# Patient Record
Sex: FEMALE | Race: BLACK OR AFRICAN AMERICAN | Employment: FULL TIME | URBAN - METROPOLITAN AREA
[De-identification: names, ages, dates, MRNs, and addresses within clinical notes are randomized per-mention and may not be internally consistent; named-entity substitution may affect disease eponyms.]

---

## 2017-02-08 ENCOUNTER — TELEPHONE (OUTPATIENT)
Dept: SURGERY | Age: 67
End: 2017-02-08

## 2017-02-08 RX ORDER — FLUCONAZOLE 150 MG/1
TABLET ORAL
Qty: 2 TAB | Refills: 1 | Status: SHIPPED | OUTPATIENT
Start: 2017-02-08 | End: 2018-01-19

## 2017-02-08 RX ORDER — NYSTATIN 100000 U/G
CREAM TOPICAL 2 TIMES DAILY
Qty: 15 G | Refills: 2 | Status: SHIPPED | OUTPATIENT
Start: 2017-02-08 | End: 2018-01-19

## 2018-01-19 ENCOUNTER — OFFICE VISIT (OUTPATIENT)
Dept: SURGERY | Age: 68
End: 2018-01-19

## 2018-01-19 ENCOUNTER — HOSPITAL ENCOUNTER (OUTPATIENT)
Dept: LAB | Age: 68
Discharge: HOME | End: 2018-01-19
Payer: COMMERCIAL

## 2018-01-19 VITALS
DIASTOLIC BLOOD PRESSURE: 96 MMHG | HEART RATE: 90 BPM | SYSTOLIC BLOOD PRESSURE: 148 MMHG | TEMPERATURE: 97.6 F | HEIGHT: 67 IN | OXYGEN SATURATION: 98 % | WEIGHT: 165.6 LBS | BODY MASS INDEX: 25.99 KG/M2

## 2018-01-19 DIAGNOSIS — R10.31 RLQ ABDOMINAL PAIN: ICD-10-CM

## 2018-01-19 DIAGNOSIS — Z01.419 ENCOUNTER FOR ROUTINE GYNECOLOGICAL EXAMINATION WITH PAPANICOLAOU SMEAR OF CERVIX: Primary | ICD-10-CM

## 2018-01-19 DIAGNOSIS — N95.1 SYMPTOMATIC MENOPAUSAL OR FEMALE CLIMACTERIC STATES: ICD-10-CM

## 2018-01-19 DIAGNOSIS — Z90.710 S/P TAH (TOTAL ABDOMINAL HYSTERECTOMY): ICD-10-CM

## 2018-01-19 PROCEDURE — 88142 CYTOPATH C/V THIN LAYER: CPT | Performed by: OBSTETRICS & GYNECOLOGY

## 2018-01-19 RX ORDER — DULAGLUTIDE 1.5 MG/.5ML
INJECTION, SOLUTION SUBCUTANEOUS
COMMUNITY
Start: 2018-01-10

## 2018-01-19 RX ORDER — HUMAN INSULIN 100 [IU]/ML
INJECTION, SUSPENSION SUBCUTANEOUS
COMMUNITY
Start: 2017-11-13

## 2018-01-19 NOTE — PROGRESS NOTES
SUBJECTIVE: Maurita Shone is a 69 y.o. female who presents with desire for annual well woman exam. Patient's last menstrual period was 1980. Allergies   Allergen Reactions    Amoxicillin Other (comments)     HEADACHE        Past Medical History:   Diagnosis Date    Diabetes ([address]     Hypertension        Past Surgical History:   Procedure Laterality Date    COLPOSCOPY  95    CHRONIC VAGNITIS AND SQUAMOUS ATYPIA    HX BREAST BIOPSY      RIGHT BREAST    HX HYSTERECTOMY      ABI       OB History      Para Term  AB Living    3 3        SAB TAB Ectopic Molar Multiple Live Births                   Family History   Problem Relation Age of Onset    Diabetes Mother     Cancer Father 68     prostate cancer    Heart Disease Father     Diabetes Sister     Arthritis-osteo Brother      gout       Social History     Social History    Marital status:      Spouse name: N/A    Number of children: N/A    Years of education: N/A     Occupational History    Not on file. Social History Main Topics    Smoking status: Never Smoker    Smokeless tobacco: Never Used    Alcohol use No    Drug use: No    Sexual activity: No     Other Topics Concern    Not on file     Social History Narrative       Current Outpatient Prescriptions   Medication Sig Dispense Refill    NOVOLIN 70/30 100 unit/mL (70-30) injection       TRULICITY 1.5 TY/4.3 mL sub-q pen       valsartan (DIOVAN) 80 mg tablet Take  by mouth daily.  rosuvastatin (CRESTOR) 20 mg tablet Take 20 mg by mouth nightly.  sitaGLIPtin-metFORMIN (JANUMET) 50-1,000 mg per tablet Take 1 Tab by mouth two (2) times daily (with meals).  aspirin 81 mg tablet Take 81 mg by mouth. Review of Systems:   Constitutional: No weight change, chills or fever, anorexia, weakness or sleep disturbance . Cardiovascular: No chest pain, shortness of breath, or palpitations .   Respiratory: No cough, shortness of breath, hemoptysis, or orthopnea . Neurologic: No syncope, headaches or seizures . Hematologic: No easy bruising or unusual bleeding . Psychiatric: No insomnia, confusion, depression, or anxiety . GI:No nausea and vomiting, diarrhea or constipation  . : See HPI . Musculoskeletal: No joint pain or muscle pain . Endocrine: No polydipsia, polyuria, cold intolerance, excessive fatigue, or sleep disturbance . Integumentary: No breast pain, lumps, nipple discharge, or axillary lumps . Objective:     Visit Vitals    BP (!) 148/96    Pulse 90    Temp 97.6 °F (36.4 °C) (Temporal)    Ht 5' 7\" (1.702 m)    Wt 165 lb 9.6 oz (75.1 kg)    LMP 12/08/1980    SpO2 98%    BMI 25.94 kg/m2       General:  alert, cooperative, no distress, appears stated age   Skin:  no rash or abnormalities   Eyes: negative   Mouth: MMM no lesions   Lymph Nodes:  Cervical, supraclavicular, and axillary nodes normal.   Breast Exam: normal appearance, no masses or tenderness    Lungs:  clear to auscultation bilaterally   Heart:  regular rate and rhythm   Abdomen: abnormal findings:  tenderness moderate in the RLQ   Back:  Costovertebral angle tenderness absent   Genitourinary: Pelvic exam: VULVA: normal appearing vulva with no masses, tenderness or lesions, VAGINA: normal appearing vagina with normal color and discharge, no lesions, CERVIX: surgically absent, UTERUS: surgically absent, vaginal cuff well healed, ADNEXA: tenderness right    Extremities:  extremities normal, atraumatic, no cyanosis or edema   Neurologic:  sensation grossly intact. Psychiatric:  non focal     ASSESSMENT:      ICD-10-CM ICD-9-CM    1. Encounter for routine gynecological examination with Papanicolaou smear of cervix Z01.419 V72.31 PAP, LB, RFX HPV DHEOP([id number])     V76.2 OBTAINING SCREEN PAP SMEAR   2. Symptomatic menopausal or female climacteric states N95.1 627.2    3. S/P ABI (total abdominal hysterectomy) Z90.710 V88.01    4.  RLQ abdominal pain R10.31 789.03 US PELV NON OBS      US TRANSVAGINAL        Follow-up Disposition:  Return in about 1 year (around 1/19/2019), or if symptoms worsen or fail to improve.

## 2018-01-19 NOTE — MR AVS SNAPSHOT
[address] [address] [address] [id number] [phone] Patient: Shelley Milan MRN: [id number] HHE:[birth year] Visit Information Date & Time Provider Department Dept. Phone Encounter #  
 1/19/2018 10:30 AM Nicky Chaudhry, [address] [place] [address] Surgical [address] [id number] Follow-up Instructions Return in about 1 year (around 1/19/2019), or if symptoms worsen or fail to improve. Upcoming Health Maintenance Date Due Hepatitis C Screening 1950 HEMOGLOBIN A1C Q6M 1950 LIPID PANEL Q1 1950 FOOT EXAM Q1 4/1/1958 MICROALBUMIN Q1 4/1/1958 EYE EXAM RETINAL OR DILATED Q1 4/1/1958 DTaP/Tdap/Td series (1 - Tdap) 4/1/1969 FOBT Q 1 YEAR AGE 50-75 4/1/1998 ZOSTER VACCINE AGE 60> 2/1/2008 GLAUCOMA SCREENING Q2Y 4/1/2013 OSTEOPOROSIS SCREENING (DEXA) 4/1/2013 Pneumococcal 65+ Low/Medium Risk (1 of 2 - PCV13) 4/1/2013 MEDICARE YEARLY EXAM 4/1/2013 Influenza Age 5 to Adult 8/1/2017 BREAST CANCER SCRN MAMMOGRAM 9/13/2019 Allergies as of 1/19/2018  Review Complete On: 1/19/2018 By: Nicky Chaudhry MD  
  
 Severity Noted Reaction Type Reactions Amoxicillin  12/08/2011    Other (comments) HEADACHE Current Immunizations  Never Reviewed No immunizations on file. Not reviewed this visit You Were Diagnosed With   
  
 Codes Comments Encounter for routine gynecological examination with Papanicolaou smear of cervix    -  Primary ICD-10-CM: U04.302 ICD-9-CM: V72.31, V76.2 Symptomatic menopausal or female climacteric states     ICD-10-CM: N95.1 ICD-9-CM: 627.2 S/P ABI (total abdominal hysterectomy)     ICD-10-CM: Z90.710 ICD-9-CM: V88.01   
 RLQ abdominal pain     ICD-10-CM: R10.31 ICD-9-CM: 789.03 Vitals  BP Pulse Temp Height(growth percentile) Weight(growth percentile) LMP  
 (!) 148/96 90 97.6 °F (36.4 °C) (Temporal) 5' 7\" (1.702 m) 165 lb 9.6 oz (75.1 kg) 12/08/1980 SpO2 BMI OB Status Smoking Status 98% 25.94 kg/m2 Hysterectomy Never Smoker Vitals History BMI and BSA Data Body Mass Index Body Surface Area  
 25.94 kg/m 2 1.88 m 2 Preferred Pharmacy Pharmacy Name Phone [facility] [address] [phone] Your Updated Medication List  
  
   
This list is accurate as of: 1/19/18 12:02 PM.  Always use your most recent med list.  
  
  
  
  
 aspirin 81 mg tablet Take 81 mg by mouth. JANUMET 50-1,000 mg per tablet Generic drug:  SITagliptin-metFORMIN Take 1 Tab by mouth two (2) times daily (with meals). NovoLIN 70/30 100 unit/mL (70-30) injection Generic drug:  insulin NPH/insulin regular  
  
 rosuvastatin 20 mg tablet Commonly known as:  CRESTOR Take 20 mg by mouth nightly. TRULICITY 1.5 HF/2.2 mL sub-q pen Generic drug:  dulaglutide  
  
 valsartan 80 mg tablet Commonly known as:  DIOVAN Take  by mouth daily. We Performed the Following OBTAINING SCREEN PAP SMEAR [ [facility]] PAP, LB, RFX HPV [id number]) [id number] CPT(R)] Follow-up Instructions Return in about 1 year (around 1/19/2019), or if symptoms worsen or fail to improve. To-Do List   
 01/19/2018 Imaging:  US PELV NON OBS   
  
 01/19/2018 Imaging:  US TRANSVAGINAL Introducing [facility]! [employer] introduces [employer] patient portal. Now you can access parts of your medical record, email your doctor's office, and request medication refills online. 1. In your internet browser, go to [email or web address] 2. Click on the First Time User? Click Here link in the Sign In box. You will see the New Member Sign Up page. 3. Enter your [employer] Access Code exactly as it appears below. You will not need to use this code after youve completed the sign-up process.  If you do not sign up before the expiration date, you must request a new code. · [employer] Access Code: [email or web address] Expires: 4/19/2018 10:34 AM 
 
4. Enter the last four digits of your Social Security Number (xxxx) and Date of Birth (mm/dd/yyyy) as indicated and click Submit. You will be taken to the next sign-up page. 5. Create a [employer] ID. This will be your [employer] login ID and cannot be changed, so think of one that is secure and easy to remember. 6. Create a [employer] password. You can change your password at any time. 7. Enter your Password Reset Question and Answer. This can be used at a later time if you forget your password. 8. Enter your e-mail address. You will receive e-mail notification when new information is available in [address] 9. Click Sign Up. You can now view and download portions of your medical record. 10. Click the Download Summary menu link to download a portable copy of your medical information. If you have questions, please visit the Frequently Asked Questions section of the [employer] website. Remember, [employer] is NOT to be used for urgent needs. For medical emergencies, dial 911. Now available from your iPhone and Android! Please provide this summary of care documentation to your next provider. Your primary care clinician is listed as Danny Felty. If you have any questions after today's visit, please call [phone].

## 2018-01-23 ENCOUNTER — HOSPITAL ENCOUNTER (OUTPATIENT)
Dept: ULTRASOUND IMAGING | Age: 68
Discharge: HOME | End: 2018-01-23
Attending: OBSTETRICS & GYNECOLOGY
Payer: COMMERCIAL

## 2018-01-23 DIAGNOSIS — R10.31 RLQ ABDOMINAL PAIN: ICD-10-CM

## 2018-01-23 PROCEDURE — 76830 TRANSVAGINAL US NON-OB: CPT

## 2018-01-23 PROCEDURE — 76856 US EXAM PELVIC COMPLETE: CPT

## 2019-01-22 ENCOUNTER — OFFICE VISIT (OUTPATIENT)
Dept: SURGERY | Age: 69
End: 2019-01-22

## 2019-01-22 VITALS
BODY MASS INDEX: 24.96 KG/M2 | DIASTOLIC BLOOD PRESSURE: 105 MMHG | WEIGHT: 159 LBS | OXYGEN SATURATION: 99 % | TEMPERATURE: 97.8 F | HEIGHT: 67 IN | RESPIRATION RATE: 18 BRPM | HEART RATE: 98 BPM | SYSTOLIC BLOOD PRESSURE: 179 MMHG

## 2019-01-22 DIAGNOSIS — Z12.31 VISIT FOR SCREENING MAMMOGRAM: ICD-10-CM

## 2019-01-22 DIAGNOSIS — Z01.419 ENCOUNTER FOR GYNECOLOGICAL EXAMINATION WITHOUT ABNORMAL FINDING: Primary | ICD-10-CM

## 2019-01-22 DIAGNOSIS — N89.8 VAGINAL DISCHARGE: ICD-10-CM

## 2019-01-22 RX ORDER — FLUCONAZOLE 150 MG/1
150 TABLET ORAL
Qty: 2 TAB | Refills: 1 | Status: SHIPPED | OUTPATIENT
Start: 2019-01-22 | End: 2019-08-01 | Stop reason: SDUPTHER

## 2019-01-22 RX ORDER — LOSARTAN POTASSIUM 25 MG/1
TABLET ORAL DAILY
COMMUNITY

## 2019-01-22 NOTE — PROGRESS NOTES
Annual exam ages 69+ post hysterectomy Hiren Blanca is a ,  77 y.o. female [address] Patient's last menstrual period was 1980. She presents for her annual checkup. She is having vaginal itching. Has been going on for about 2 weeks. Does have some thick white discharge. Feels like a yeast infection. Patient with elevated blood sugars recently. With regard to the Gardasil vaccine, she is older than the age for which it is FDA approved. Hormonal status: 
She is not having vasomotor symptoms. The patient is not using any ERT. Sexual history: She  reports that she does not engage in sexual activity. Medical conditions: 
 
Since her last annual GYN exam about one year ago, she has not the following changes in her health history: none. Surgical history confirmed with patient. has a past surgical history that includes colposcopy (95); hx breast biopsy (); and hx hysterectomy (). Pap and Mammogram History: 
 
Her most recent Pap smear was  - normal.  No hx of abnormal paps. S/p hysterectomy for heavy bleeding. The patient with mammogram last September. Breast Cancer History/Substance Abuse: negative Osteoporosis History: 
 
Family history does not include a first or second degree relative with osteopenia or osteoporosis. She is currently taking calcium and vit D. Past Medical History:  
Diagnosis Date  Diabetes ([address]  Hypertension Past Surgical History:  
Procedure Laterality Date  COLPOSCOPY  95 CHRONIC VAGNITIS AND SQUAMOUS ATYPIA  HX BREAST BIOPSY   RIGHT BREAST [address] [facility] Current Outpatient Medications Medication Sig Dispense Refill  NOVOLIN 70/30 100 unit/mL (70-30) injection  TRULICITY 1.5 RV/2.7 mL sub-q pen  valsartan (DIOVAN) 80 mg tablet Take  by mouth daily.  rosuvastatin (CRESTOR) 20 mg tablet Take 20 mg by mouth nightly.  sitaGLIPtin-metFORMIN (JANUMET) 50-1,000 mg per tablet Take 1 Tab by mouth two (2) times daily (with meals).  aspirin 81 mg tablet Take 81 mg by mouth. Allergies: Amoxicillin Tobacco History:  reports that  has never smoked. she has never used smokeless tobacco. 
Alcohol Abuse:  reports that she does not drink alcohol. Drug Abuse:  reports that she does not use drugs. Patient feels safe at home. Family Medical/Cancer History:  
Family History Problem Relation Age of Onset  Diabetes Mother  Cancer Father 68  
     prostate cancer  Heart Disease Father  Diabetes Sister  Arthritis-osteo Brother   
     gout Review of Systems - History obtained from the patient Constitutional: negative for weight loss, fever, night sweats HEENT: negative for hearing loss, earache, congestion, snoring, sorethroat CV: negative for chest pain, palpitations, edema Resp: negative for cough, shortness of breath, wheezing GI: negative for change in bowel habits, abdominal pain, black or bloody stools : negative for frequency, dysuria, hematuria, vaginal discharge MSK: negative for back pain, joint pain, muscle pain Breast: negative for breast lumps, nipple discharge, galactorrhea Skin :negative for itching, rash, hives Neuro: negative for dizziness, headache, confusion, weakness Psych: negative for anxiety, depression, change in mood Heme/lymph: negative for bleeding, bruising, pallor Physical Exam 
 
Visit Vitals BP (!) 179/105 (BP 1 Location: Right arm, BP Patient Position: Sitting) Comment: Pt report she did not take BP medication today provider Pulse 98 Temp 97.8 °F (36.6 °C) (Oral) Resp 18 Ht 5' 7\" (1.702 m) Wt 159 lb (72.1 kg) LMP 12/08/1980 SpO2 99% BMI 24.90 kg/m² Constitutional 
· Appearance: well-nourished, well developed, alert, in no acute distress HENT 
· Head and Face: appears normal 
 
Neck · Inspection/Palpation: normal appearance, no masses or tenderness · Lymph Nodes: no lymphadenopathy present · Thyroid: gland size normal, nontender, no nodules or masses present on palpation Chest 
· Respiratory Effort: breathing unlabored · Auscultation: normal breath sounds Cardiovascular · Heart: 
· Auscultation: regular rate and rhythm without murmur Breasts · Inspection of Breasts: breasts symmetrical, no skin changes, no discharge present, nipple appearance normal, no skin retraction present · Palpation of Breasts and Axillae: no masses present on palpation, no breast tenderness · Axillary Lymph Nodes: no lymphadenopathy present Gastrointestinal 
· Abdominal Examination: abdomen non-tender to palpation, normal bowel sounds, no masses present · Liver and spleen: no hepatomegaly present, spleen not palpable · Hernias: no hernias identified Genitourinary · External Genitalia: normal appearance for age other than slight atrophy of labia minora, no discharge present, no tenderness present, no inflammatory lesions present, no masses present, atrophy present · Vagina: atrophic but otherwise normal vaginal vault without central or paravaginal defects, moderate white discharge present, + diffuse inflammation present, no masses present · Bladder: non-tender to palpation · Urethra: appears normal 
· Cervix: absent · Uterus: absent · Adnexa: no adnexal tenderness present, no adnexal masses present · Perineum: perineum within normal limits, no evidence of trauma, no rashes or skin lesions present · Anus: anus within normal limits, no hemorrhoids present · Inguinal Lymph Nodes: no lymphadenopathy present Skin · General Inspection: no rash, no lesions identified Neurologic/Psychiatric · Mental Status: · Orientation: grossly oriented to person, place and time · Mood and Affect: mood normal, affect appropriate Assessment: 
Routine gynecologic examination Her current medical status is satisfactory with no evidence of significant gynecologic issues. Plan: 
- pap smear not indicated 
- mammogram 
- will treat for a yeast infection due to DM. If this does not improve symptoms, will consider treating for lichen sclerosus. - nuswab - patient advised to take her BP meds Counseled re: diet, exercise, healthy lifestyle Return for yearly wellness visits Rec annual mammogram

## 2019-01-22 NOTE — PATIENT INSTRUCTIONS
Learning About Breast Cancer Screening What is breast cancer screening? Breast cancer occurs when cells that are not normal grow in one or both of your breasts. Screening tests can help find breast cancer early. Cancer is easier to treat when it's found early. Having concerns about breast cancer is common. That's why it's important to talk with your doctor about when to start and how often to get screened for breast cancer. How is breast cancer screening done? Several screening tests can be used to check for breast cancer. · Mammograms check for signs of cancer using X-rays. They can show tumors that are too small for you or your doctor to feel. During a mammogram, a machine squeezes your breasts to make them flatter and easier to X-ray. At least two pictures are taken of each breast. One is taken from the top and one from the side. · 3-D mammograms are also called digital breast tomosynthesis. Your breast is positioned on a flat plate. A top plate is pressed against your breast to keep it in position. The X-ray arm then moves in an arc above the breast and takes many pictures. A computer uses these X-rays to create a three-dimensional image. · Clinical breast exams are a doctor's exam. Your doctor carefully feels your breasts and under your arms to check for lumps or other changes. After the screening, your doctor will tell you the results. You will also be told if you need any follow-up tests. When should you get screened? Talk with your doctor about when you should start being tested for breast cancer. How often you get tested and the kind of tests you get will depend on your age and your risk. The guidelines that follow are for women who have an average risk for breast cancer. If you have a higher risk for breast cancer, such as having a family history of breast cancer in multiple relatives or at a young age, your doctor may recommend different screening for you. · Ages 21 to 44: Some experts recommend that women have a clinical breast exam every 3 years, starting at age 21. Ask your doctor how often you should have this test. If you have a high risk for breast cancer, talk with your doctor about when to start yearly mammograms and other screening tests. · Ages 36 and older: Talk with your doctor about how often you should have mammograms and clinical breast exams. What is your risk for breast cancer? If you don't already know your risk of breast cancer, you can ask your doctor about it. You can also look it up at www.cancer.gov/bcrisktool/. If your doctor says that you have a high or very high risk, ask about ways to reduce your risk. These could include getting extra screening, taking medicine, or having surgery. If you have a strong family history of breast cancer, ask your doctor about genetic testing. What steps can you take to stay healthy? Some things that increase your risk of breast cancer, such as your age and being female, cannot be controlled. But you can do some things to stay as healthy as you can. · Learn what your breasts normally look and feel like. If you notice any changes, tell your doctor. · Drink alcohol wisely. Your risk goes up the more you drink. For the best health, women should have no more than 1 drink a day or 7 drinks a week. · If you smoke, quit. When you quit smoking, you lower your chances of getting many types of cancer. You can also do your best to eat well, be active, and stay at a healthy weight. Eating healthy foods and being active every day, as well as staying at a healthy weight, may help prevent cancer. Where can you learn more? Go to http://raheem-jocelyn.info/. Enter W052 in the search box to learn more about \"Learning About Breast Cancer Screening. \" Current as of: March 27, 2018 Content Version: 11.9 © 4423-7445 [employer].  Care instructions adapted under license by [address] (which disclaims liability or warranty for this information). If you have questions about a medical condition or this instruction, always ask your healthcare professional. [address] any warranty or liability for your use of this information. Well Visit, Over 72: Care Instructions Your Care Instructions Physical exams can help you stay healthy. Your doctor has checked your overall health and may have suggested ways to take good care of yourself. He or she also may have recommended tests. At home, you can help prevent illness with healthy eating, regular exercise, and other steps. Follow-up care is a key part of your treatment and safety. Be sure to make and go to all appointments, and call your doctor if you are having problems. It's also a good idea to know your test results and keep a list of the medicines you take. How can you care for yourself at home? · Reach and stay at a healthy weight. This will lower your risk for many problems, such as obesity, diabetes, heart disease, and high blood pressure. · Get at least 30 minutes of exercise on most days of the week. Walking is a good choice. You also may want to do other activities, such as running, swimming, cycling, or playing tennis or team sports. · Do not smoke. Smoking can make health problems worse. If you need help quitting, talk to your doctor about stop-smoking programs and medicines. These can increase your chances of quitting for good. · Protect your skin from too much sun. When you're outdoors from 10 a.m. to 4 p.m., stay in the shade or cover up with clothing and a hat with a wide brim. Wear sunglasses that block UV rays. Even when it's cloudy, put broad-spectrum sunscreen (SPF 30 or higher) on any exposed skin. · See a dentist one or two times a year for checkups and to have your teeth cleaned. · Wear a seat belt in the car. · Limit alcohol to 2 drinks a day for men and 1 drink a day for women. Too much alcohol can cause health problems. Follow your doctor's advice about when to have certain tests. These tests can spot problems early. For men and women · Cholesterol. Your doctor will tell you how often to have this done based on your overall health and other things that can increase your risk for heart attack and stroke. · Blood pressure. Have your blood pressure checked during a routine doctor visit. Your doctor will tell you how often to check your blood pressure based on your age, your blood pressure results, and other factors. · Diabetes. Ask your doctor whether you should have tests for diabetes. · Vision. Experts recommend that you have yearly exams for glaucoma and other age-related eye problems. · Hearing. Tell your doctor if you notice any change in your hearing. You can have tests to find out how well you hear. · Colon cancer tests. Keep having colon cancer tests as your doctor recommends. You can have one of several types of tests. · Heart attack and stroke risk. At least every 4 to 6 years, you should have your risk for heart attack and stroke assessed. Your doctor uses factors such as your age, blood pressure, cholesterol, and whether you smoke or have diabetes to show what your risk for a heart attack or stroke is over the next 10 years. · Osteoporosis. Talk to your doctor about whether you should have a bone density test to find out whether you have thinning bones. Also ask your doctor about whether you should take calcium and vitamin D supplements. For women · Pap test and pelvic exam. You may no longer need a Pap test. Talk with your doctor about whether to stop or continue to have Pap tests. · Breast exam and mammogram. Ask how often you should have a mammogram, which is an X-ray of your breasts. A mammogram can spot breast cancer before it can be felt and when it is easiest to treat. · Thyroid disease. Talk to your doctor about whether to have your thyroid checked as part of a regular physical exam. Women have an increased chance of a thyroid problem. For men · Prostate exam. Talk to your doctor about whether you should have a blood test (called a PSA test) for prostate cancer. Experts disagree on whether men should have this test. Some experts recommend that you discuss the benefits and risks of the test with your doctor. · Abdominal aortic aneurysm. Ask your doctor whether you should have a test to check for an aneurysm. You may need a test if you ever smoked or if your parent, brother, sister, or child has had an aneurysm. When should you call for help? Watch closely for changes in your health, and be sure to contact your doctor if you have any problems or symptoms that concern you. Where can you learn more? Go to http://raheem-jocelyn.info/. Enter B055 in the search box to learn more about \"Well Visit, Over 65: Care Instructions. \" Current as of: March 28, 2018 Content Version: 11.9 © 2300-8355 [employer]. Care instructions adapted under license by [employer] (which disclaims liability or warranty for this information). If you have questions about a medical condition or this instruction, always ask your healthcare professional. [address] any warranty or liability for your use of this information. Vaginal Yeast Infection: Care Instructions Your Care Instructions A vaginal yeast infection is caused by too many yeast cells in the vagina. This is common in women of all ages. Itching, vaginal discharge and irritation, and other symptoms can bother you. But yeast infections don't often cause other health problems. Some medicines can increase your risk of getting a yeast infection. These include antibiotics, birth control pills, hormones, and steroids.  You may also be more likely to get a yeast infection if you are pregnant, have diabetes, douche, or wear tight clothes. With treatment, most yeast infections get better in 2 to 3 days. Follow-up care is a key part of your treatment and safety. Be sure to make and go to all appointments, and call your doctor if you are having problems. It's also a good idea to know your test results and keep a list of the medicines you take. How can you care for yourself at home? · Take your medicines exactly as prescribed. Call your doctor if you think you are having a problem with your medicine. · Ask your doctor about over-the-counter (OTC) medicines for yeast infections. They may cost less than prescription medicines. If you use an OTC treatment, read and follow all instructions on the label. · Do not use tampons while using a vaginal cream or suppository. The tampons can absorb the medicine. Use pads instead. · Wear loose cotton clothing. Do not wear nylon or other fabric that holds body heat and moisture close to the skin. · Try sleeping without underwear. · Do not scratch. Relieve itching with a cold pack or a cool bath. · Do not wash your vaginal area more than once a day. Use plain water or a mild, unscented soap. Air-dry the vaginal area. · Change out of wet swimsuits after swimming. · Do not have sex until you have finished your treatment. · Do not douche. When should you call for help? Call your doctor now or seek immediate medical care if: 
  · You have unexpected vaginal bleeding.  
  · You have new or increased pain in your vagina or pelvis.  
 Watch closely for changes in your health, and be sure to contact your doctor if: 
  · You have a fever.  
  · You are not getting better after 2 days.  
  · Your symptoms come back after you finish your medicines. Where can you learn more? Go to http://raheem-jocelyn.info/.  
Enter G836 in the search box to learn more about \"Vaginal Yeast Infection: Care Instructions. \" Current as of: May 14, 2018 Content Version: 11.9 © 4652-2355 [employer]. Care instructions adapted under license by [employer] (which disclaims liability or warranty for this information). If you have questions about a medical condition or this instruction, always ask your healthcare professional. [place] [address] any warranty or liability for your use of this information.

## 2019-01-22 NOTE — PROGRESS NOTES
Chief Complaint Patient presents with  Well Woman Pt presents in office for annual exam.Pt complaints of sporatic vaginal itching and irritation x 2 weeks. Last pap 1/18. Last mammogram 9/18. Patients blood pressure is elevated today 179/105 pt stated she did not take her blood pressure medication today pt denies headache, CP, or visual disturbances provider aware. PHQ over the last two weeks 1/22/2019 Little interest or pleasure in doing things Not at all Feeling down, depressed, irritable, or hopeless Not at all Total Score PHQ 2 0  
 
1. Have you been to the ER, urgent care clinic since your last visit? Hospitalized since your last visit? No 
 
2. Have you seen or consulted any other health care providers outside of the [address] [place] [address] since your last visit? Include any pap smears or colon screening.  No

## 2019-01-24 LAB
A VAGINAE DNA VAG QL NAA+PROBE: NORMAL SCORE
BVAB2 DNA VAG QL NAA+PROBE: NORMAL SCORE
C ALBICANS DNA VAG QL NAA+PROBE: NEGATIVE
C GLABRATA DNA VAG QL NAA+PROBE: NEGATIVE
MEGA1 DNA VAG QL NAA+PROBE: NORMAL SCORE
T VAGINALIS RRNA SPEC QL NAA+PROBE: NEGATIVE

## 2020-01-23 ENCOUNTER — OFFICE VISIT (OUTPATIENT)
Dept: OBGYN CLINIC | Age: 70
End: 2020-01-23

## 2020-01-23 VITALS
BODY MASS INDEX: 25.08 KG/M2 | DIASTOLIC BLOOD PRESSURE: 97 MMHG | WEIGHT: 159.8 LBS | RESPIRATION RATE: 18 BRPM | HEIGHT: 67 IN | SYSTOLIC BLOOD PRESSURE: 165 MMHG | HEART RATE: 110 BPM

## 2020-01-23 DIAGNOSIS — Z90.710 S/P TAH (TOTAL ABDOMINAL HYSTERECTOMY): ICD-10-CM

## 2020-01-23 DIAGNOSIS — Z01.419 ENCOUNTER FOR GYNECOLOGICAL EXAMINATION WITHOUT ABNORMAL FINDING: Primary | ICD-10-CM

## 2020-01-23 NOTE — PATIENT INSTRUCTIONS
Well Visit, Over 72: Care Instructions Your Care Instructions Physical exams can help you stay healthy. Your doctor has checked your overall health and may have suggested ways to take good care of yourself. He or she also may have recommended tests. At home, you can help prevent illness with healthy eating, regular exercise, and other steps. Follow-up care is a key part of your treatment and safety. Be sure to make and go to all appointments, and call your doctor if you are having problems. It's also a good idea to know your test results and keep a list of the medicines you take. How can you care for yourself at home? · Reach and stay at a healthy weight. This will lower your risk for many problems, such as obesity, diabetes, heart disease, and high blood pressure. · Get at least 30 minutes of exercise on most days of the week. Walking is a good choice. You also may want to do other activities, such as running, swimming, cycling, or playing tennis or team sports. · Do not smoke. Smoking can make health problems worse. If you need help quitting, talk to your doctor about stop-smoking programs and medicines. These can increase your chances of quitting for good. · Protect your skin from too much sun. When you're outdoors from 10 a.m. to 4 p.m., stay in the shade or cover up with clothing and a hat with a wide brim. Wear sunglasses that block UV rays. Even when it's cloudy, put broad-spectrum sunscreen (SPF 30 or higher) on any exposed skin. · See a dentist one or two times a year for checkups and to have your teeth cleaned. · Wear a seat belt in the car. Follow your doctor's advice about when to have certain tests. These tests can spot problems early. For men and women · Cholesterol. Your doctor will tell you how often to have this done based on your overall health and other things that can increase your risk for heart attack and stroke. · Blood pressure. Have your blood pressure checked during a routine doctor visit. Your doctor will tell you how often to check your blood pressure based on your age, your blood pressure results, and other factors. · Diabetes. Ask your doctor whether you should have tests for diabetes. · Vision. Experts recommend that you have yearly exams for glaucoma and other age-related eye problems. · Hearing. Tell your doctor if you notice any change in your hearing. You can have tests to find out how well you hear. · Colon cancer tests. Keep having colon cancer tests as your doctor recommends. You can have one of several types of tests. · Heart attack and stroke risk. At least every 4 to 6 years, you should have your risk for heart attack and stroke assessed. Your doctor uses factors such as your age, blood pressure, cholesterol, and whether you smoke or have diabetes to show what your risk for a heart attack or stroke is over the next 10 years. · Osteoporosis. Talk to your doctor about whether you should have a bone density test to find out whether you have thinning bones. Also ask your doctor about whether you should take calcium and vitamin D supplements. For women · Pap test and pelvic exam. You may no longer need a Pap test. Talk with your doctor about whether to stop or continue to have Pap tests. · Breast exam and mammogram. Ask how often you should have a mammogram, which is an X-ray of your breasts. A mammogram can spot breast cancer before it can be felt and when it is easiest to treat. · Thyroid disease. Talk to your doctor about whether to have your thyroid checked as part of a regular physical exam. Women have an increased chance of a thyroid problem. For men · Prostate exam. Talk to your doctor about whether you should have a blood test (called a PSA test) for prostate cancer.  Experts recommend that you discuss the benefits and risks of the test with your doctor before you decide whether to have this test. Some experts say that men ages 79 and older no longer need testing. · Abdominal aortic aneurysm. Ask your doctor whether you should have a test to check for an aneurysm. You may need a test if you ever smoked or if your parent, brother, sister, or child has had an aneurysm. When should you call for help? Watch closely for changes in your health, and be sure to contact your doctor if you have any problems or symptoms that concern you. Where can you learn more? Go to http://raheem-jocelyn.info/. Enter V987 in the search box to learn more about \"Well Visit, Over 65: Care Instructions. \" Current as of: December 13, 2018 Content Version: 12.2 © 3544-7355 [employer]. Care instructions adapted under license by [employer] (which disclaims liability or warranty for this information). If you have questions about a medical condition or this instruction, always ask your healthcare professional. [place] [address] any warranty or liability for your use of this information.

## 2020-01-23 NOTE — PROGRESS NOTES
Annual exam ages 69+ post hysterectomy    Tsering Price is a ,  68 y.o. female [address] Patient's last menstrual period was 1980. She presents for her annual checkup. She is having with cold symptoms. Not taking anything for this. Symptoms started about 2-3 days ago. Patient with yeast infection last week. With regard to the Gardasil vaccine, she is older than the age for which it is FDA approved. Hormonal status:  S/p hysterectomy  She is not having vasomotor symptoms. The patient is not using any ERT. Sexual history:    She  reports never being sexually active. Medical conditions:    Since her last annual GYN exam about one year ago, she has not the following changes in her health history: none. Surgical history confirmed with patient. has a past surgical history that includes colposcopy (95); hx breast biopsy (); and hx hysterectomy (). Pap and Mammogram History:    Her most recent Pap smear was 2018 - normal.  No hx of abnormal paps. S/p hysterectomy for heavy bleeding. The patient with normal mammogram 2019. Breast Cancer History/Substance Abuse: negative      Osteoporosis History:    Family history does not include a first or second degree relative with osteopenia or osteoporosis. She is currently taking calcium and vit D. Past Medical History:   Diagnosis Date    Diabetes ([address]     Hypertension      Past Surgical History:   Procedure Laterality Date    COLPOSCOPY  95    CHRONIC VAGNITIS AND SQUAMOUS ATYPIA    HX BREAST BIOPSY      RIGHT BREAST    HX HYSTERECTOMY      ABI       Current Outpatient Medications   Medication Sig Dispense Refill    losartan (COZAAR) 25 mg tablet Take  by mouth daily.  TRULICITY 1.5 BE/2.5 mL sub-q pen       rosuvastatin (CRESTOR) 20 mg tablet Take 20 mg by mouth nightly.       sitaGLIPtin-metFORMIN (JANUMET) 50-1,000 mg per tablet Take 1 Tab by mouth two (2) times daily (with meals).  aspirin 81 mg tablet Take 81 mg by mouth.  fluconazole (DIFLUCAN) 150 mg tablet TAKE 1 TABLET BY MOUTH EVERY 3 DAYS FOR 2 DAYS 2 Tab 0    NOVOLIN 70/30 100 unit/mL (70-30) injection       valsartan (DIOVAN) 80 mg tablet Take  by mouth daily. Allergies: Amoxicillin     Tobacco History:  reports that she has never smoked. She has never used smokeless tobacco.  Alcohol Abuse:  reports no history of alcohol use. Drug Abuse:  reports no history of drug use. Patient feels safe at home.     Family Medical/Cancer History:   Family History   Problem Relation Age of Onset    Diabetes Mother     Cancer Father 68        prostate cancer    Heart Disease Father     Diabetes Sister     Arthritis-osteo Brother         gout        Review of Systems - History obtained from the patient  Constitutional: negative for weight loss, fever, night sweats  HEENT: negative for hearing loss, earache, congestion, snoring, sorethroat  CV: negative for chest pain, palpitations, edema  Resp: negative for cough, shortness of breath, wheezing  GI: negative for change in bowel habits, abdominal pain, black or bloody stools  : negative for frequency, dysuria, hematuria, vaginal discharge  MSK: negative for back pain, joint pain, muscle pain  Breast: negative for breast lumps, nipple discharge, galactorrhea  Skin :negative for itching, rash, hives  Neuro: negative for dizziness, headache, confusion, weakness  Psych: negative for anxiety, depression, change in mood  Heme/lymph: negative for bleeding, bruising, pallor    Physical Exam    Visit Vitals  BP (!) 165/97 (BP 1 Location: Right arm, BP Patient Position: Sitting)   Pulse (!) 110   Resp 18   Ht 5' 7\" (1.702 m)   Wt 159 lb 12.8 oz (72.5 kg)   LMP 12/08/1980   BMI 25.03 kg/m²       Constitutional  · Appearance: well-nourished, well developed, alert, in no acute distress    HENT  · Head and Face: appears normal    Neck  · Inspection/Palpation: normal appearance, no masses or tenderness  · Lymph Nodes: no lymphadenopathy present  · Thyroid: gland size normal, nontender, no nodules or masses present on palpation    Chest  · Respiratory Effort: breathing unlabored  · Auscultation: normal breath sounds    Cardiovascular  · Heart:  · Auscultation: regular rate and rhythm without murmur    Breasts  · Inspection of Breasts: breasts symmetrical, no skin changes, no discharge present, nipple appearance normal, no skin retraction present  · Palpation of Breasts and Axillae: no masses present on palpation, no breast tenderness  · Axillary Lymph Nodes: no lymphadenopathy present    Gastrointestinal  · Abdominal Examination: abdomen non-tender to palpation, normal bowel sounds, no masses present  · Liver and spleen: no hepatomegaly present, spleen not palpable  · Hernias: no hernias identified    Genitourinary  · External Genitalia: normal appearance for age, no discharge present, no tenderness present, no inflammatory lesions present, no masses present, atrophy present  · Vagina: atrophic but otherwise normal vaginal vault without central or paravaginal defects, no discharge present, no inflammatory lesions present, no masses present  · Bladder: non-tender to palpation  · Urethra: appears normal  · Cervix: absent   · Uterus: absent  · Adnexa: no adnexal tenderness present, no adnexal masses present  · Perineum: perineum within normal limits, no evidence of trauma, no rashes or skin lesions present  · Anus: anus within normal limits, no hemorrhoids present  · Inguinal Lymph Nodes: no lymphadenopathy present    Skin  · General Inspection: no rash, no lesions identified    Neurologic/Psychiatric  · Mental Status:  · Orientation: grossly oriented to person, place and time  · Mood and Affect: mood normal, affect appropriate    Assessment:  Routine gynecologic examination  Her current medical status is satisfactory with no evidence of significant gynecologic issues.     Plan:  - pap not indicated  - mammogram up to date  - advised Ca and Vitamin D  - advised to take BP meds as directed    Counseled re: diet, exercise, healthy lifestyle  Return for yearly wellness visits  Rec annual mammogram

## 2020-01-23 NOTE — PROGRESS NOTES
Chief Complaint   Patient presents with    Well Woman     Pt voices no complains. Last mammogram 8/2019, pt states was normal. Pt's BP elevated, has not taken  BP med today. 3 most recent PHQ Screens 1/22/2019   Little interest or pleasure in doing things Not at all   Feeling down, depressed, irritable, or hopeless Not at all   Total Score PHQ 2 0       Fall Risk Assessment, last 12 mths 1/23/2020   Able to walk? Yes   Fall in past 12 months?  No